# Patient Record
Sex: MALE | URBAN - METROPOLITAN AREA
[De-identification: names, ages, dates, MRNs, and addresses within clinical notes are randomized per-mention and may not be internally consistent; named-entity substitution may affect disease eponyms.]

---

## 2021-12-15 ENCOUNTER — HOSPITAL ENCOUNTER (EMERGENCY)
Facility: CLINIC | Age: 26
Discharge: HOME | End: 2021-12-15

## 2021-12-15 NOTE — ED TRIAGE NOTES
Pt was not triaged, states he was seen in ED for a hydrocele, states the pain is much better and the lump is getting smaller, but can still feel a small lump on his testicle.